# Patient Record
Sex: FEMALE | Employment: UNEMPLOYED | URBAN - METROPOLITAN AREA
[De-identification: names, ages, dates, MRNs, and addresses within clinical notes are randomized per-mention and may not be internally consistent; named-entity substitution may affect disease eponyms.]

---

## 2020-10-21 ENCOUNTER — HOSPITAL ENCOUNTER (OUTPATIENT)
Dept: OCCUPATIONAL THERAPY | Age: 35
Setting detail: THERAPIES SERIES
Discharge: HOME | End: 2020-10-21
Payer: COMMERCIAL

## 2020-10-21 PROCEDURE — L3933 FO W/O JOINTS CF: HCPCS | Performed by: OCCUPATIONAL THERAPIST

## 2020-10-26 ENCOUNTER — OFFICE VISIT (OUTPATIENT)
Dept: ORTHOPEDIC SURGERY | Age: 35
End: 2020-10-26
Payer: COMMERCIAL

## 2020-10-26 VITALS — HEIGHT: 63 IN | BODY MASS INDEX: 19.49 KG/M2 | WEIGHT: 110 LBS

## 2020-10-26 PROCEDURE — G8427 DOCREV CUR MEDS BY ELIG CLIN: HCPCS | Performed by: ORTHOPAEDIC SURGERY

## 2020-10-26 PROCEDURE — G8484 FLU IMMUNIZE NO ADMIN: HCPCS | Performed by: ORTHOPAEDIC SURGERY

## 2020-10-26 PROCEDURE — 1036F TOBACCO NON-USER: CPT | Performed by: ORTHOPAEDIC SURGERY

## 2020-10-26 PROCEDURE — G8420 CALC BMI NORM PARAMETERS: HCPCS | Performed by: ORTHOPAEDIC SURGERY

## 2020-10-26 PROCEDURE — 99203 OFFICE O/P NEW LOW 30 MIN: CPT | Performed by: ORTHOPAEDIC SURGERY

## 2020-10-26 NOTE — PROGRESS NOTES
Assessment: Avulsion fracture of the left small finger PIP joint with a fairly significantly displaced fragment although clinically she only opens about 5 degrees to radially directed stress. She probably also by clinical exam has an associated injury to her volar plate    Treatment Plan: Hopefully this will scarring well enough to give her good stability of the PIP joint with her clinical exam today. We immediately had a clamshell splint fabricated last week after her  Giuseppe Estrada sent me the x-rays that he had taken. She feels like this is really helping her symptoms. I made sure that she understood not to do any radial or ulnar deviating stress with the finger but to start early range of motion in the axis of rotation of the joint    Return in about 3 weeks (around 11/16/2020) for X-ray next visit. Chief Complaint:  Hand Pain (left small finger injury 10/15/2020)      History of Present Illness  Amaris Choi is a 37 y.o. female. Location: left small  Severity: moderate pain Duration: 10/15/20 Modifying factors: holding on to 2 horses, one horse tried to run her over and her left small finger got stuck in a knot. Last week a splint was made for her in therapy, it helps a lot. Associated symptoms: none    Contributory History  None    Medical History    No current outpatient medications on file prior to visit. No current facility-administered medications on file prior to visit. No past medical history on file.   Allergies   Allergen Reactions    Ibuprofen Other (See Comments)     Motrin   Tingling in all extremeties     Social History     Socioeconomic History    Marital status:      Spouse name: Not on file    Number of children: Not on file    Years of education: Not on file    Highest education level: Not on file   Occupational History    Not on file   Social Needs    Financial resource strain: Not on file    Food insecurity     Worry: Not on file     Inability: Not on file    Transportation needs     Medical: Not on file     Non-medical: Not on file   Tobacco Use    Smoking status: Never Smoker    Smokeless tobacco: Never Used   Substance and Sexual Activity    Alcohol use: Not on file    Drug use: Not on file    Sexual activity: Not on file   Lifestyle    Physical activity     Days per week: Not on file     Minutes per session: Not on file    Stress: Not on file   Relationships    Social connections     Talks on phone: Not on file     Gets together: Not on file     Attends Christian service: Not on file     Active member of club or organization: Not on file     Attends meetings of clubs or organizations: Not on file     Relationship status: Not on file    Intimate partner violence     Fear of current or ex partner: Not on file     Emotionally abused: Not on file     Physically abused: Not on file     Forced sexual activity: Not on file   Other Topics Concern    Not on file   Social History Narrative    Not on file     No family history on file. Patient's medications, allergies, past medical, surgical, social and family histories were reviewed and updated as appropriate. Review of Systems  Pertinent items are noted in HPI  Denies fever chills, confusion and bowel and bladder active change  Complete Review of Systems reviewed from patient history form dated 10/26/2020 and available in the patients chart under the media tab. Vital Signs  Vitals:    10/26/20 1315   Weight: 110 lb (49.9 kg)   Height: 5' 3\" (1.6 m)     Body mass index is 19.49 kg/m².      Physical Exam  Constitutional: Normal nutritional status  Mental Status: Alert and oriented  Skin: No rashes or erythema  Lymphatic: No lymphadenopathy        Left Hand Examination:  Inspection: Mild swelling of the PIP joint small finger  Finger Range of Motion: She has about 90 degrees flexion lacks about 10 degrees of full extension  Wrist Range of Motion: Normal  Vascular Exam: Normal capillary refill  Neurologic Exam: No associated numbness or tingling  Intrinsic Muscle Strength: Normal  Extrinsic Muscle Strength: Normal  Special Tests:      Neck Exam: Full range of motion no pain    Additional Comments:     Additional Examinations:  X-Ray Findings: PA lateral and oblique x-rays of the left small finger show a bone fragment on the ulnar aspect of the PIP joint retracted proximally. This appears to actually be an avulsion fracture from the insertion of the collateral ligament which would mean that it displaced significantly proximally. I was worried on the initial x-rays that Jennifer Evelin to take in with his veterinary x-ray machine that she could have a pilon type fracture as well but I do not really detect this today on these more magnified radiographs  Additional Diagnostic Test Findings:    Office Procedures:      Time statement: This dictation was performed with a verbal recognition program. It is possible that there are still dictated errors within this office note. All efforts were made to ensure that this office note is accurate. Orders Placed This Encounter   Procedures    XR FINGER LEFT (MIN 2 VIEWS)     Standing Status:   Future     Number of Occurrences:   1     Standing Expiration Date:   10/26/2021       Attestation: I have reviewed the chief complaint and history of present illness (including ROS and PFSH) and vital documentation by my staff and I agree with their documentation and have added where applicable.

## 2020-11-16 ENCOUNTER — OFFICE VISIT (OUTPATIENT)
Dept: ORTHOPEDIC SURGERY | Age: 35
End: 2020-11-16
Payer: COMMERCIAL

## 2020-11-16 VITALS — HEIGHT: 63 IN | BODY MASS INDEX: 19.49 KG/M2 | WEIGHT: 110 LBS | RESPIRATION RATE: 16 BRPM

## 2020-11-16 PROCEDURE — 1036F TOBACCO NON-USER: CPT | Performed by: ORTHOPAEDIC SURGERY

## 2020-11-16 PROCEDURE — G8427 DOCREV CUR MEDS BY ELIG CLIN: HCPCS | Performed by: ORTHOPAEDIC SURGERY

## 2020-11-16 PROCEDURE — 99213 OFFICE O/P EST LOW 20 MIN: CPT | Performed by: ORTHOPAEDIC SURGERY

## 2020-11-16 PROCEDURE — G8420 CALC BMI NORM PARAMETERS: HCPCS | Performed by: ORTHOPAEDIC SURGERY

## 2020-11-16 PROCEDURE — G8484 FLU IMMUNIZE NO ADMIN: HCPCS | Performed by: ORTHOPAEDIC SURGERY

## 2020-11-16 NOTE — PROGRESS NOTES
Assessment: Ander Reed is now about 6 weeks post injury with a small fragment avulsion fracture of the ulnar collateral ligament of the small finger proximal interphalangeal joint on the left hand    Treatment Plan: We have been treating this with splinting to prevent radial deviation. She opens about 5 degrees to radially directed stress and I think were going to be able to continue to treat this nonoperatively in spite of the displacement of the small fragment. She is going to start doing home range of motion exercises but continue with splinting for another month    Return in about 4 weeks (around 12/14/2020) for X-ray next visit. History of Present Illness  Martha Costello is a 37 y.o. female. Location:  Left small finger Severity: moderate pain Duration: 10/15/2020  Modifying factors: still having some pain, splint helps a lot Associated symptoms: none    Review of Systems  Pertinent items are noted in HPI  Denies fever chills, confusion and bowel and bladder active change  Complete Review of Systems reviewed from patient history form dated 10/26/20 and available in the patients chart under the media tab. Vital Signs  Vitals:    11/16/20 1336   Resp: 16   Weight: 110 lb (49.9 kg)   Height: 5' 3\" (1.6 m)     Body mass index is 19.49 kg/m². Contributory History  None    Medical History  No past medical history on file. No current outpatient medications on file prior to visit. No current facility-administered medications on file prior to visit.       Allergies   Allergen Reactions    Ibuprofen Other (See Comments)     Motrin   Tingling in all extremeties     Social History     Socioeconomic History    Marital status:      Spouse name: Not on file    Number of children: Not on file    Years of education: Not on file    Highest education level: Not on file   Occupational History    Not on file   Social Needs    Financial resource strain: Not on file    Food insecurity     Worry: Not on file     Inability: Not on file    Transportation needs     Medical: Not on file     Non-medical: Not on file   Tobacco Use    Smoking status: Never Smoker    Smokeless tobacco: Never Used   Substance and Sexual Activity    Alcohol use: Not on file    Drug use: Not on file    Sexual activity: Not on file   Lifestyle    Physical activity     Days per week: Not on file     Minutes per session: Not on file    Stress: Not on file   Relationships    Social connections     Talks on phone: Not on file     Gets together: Not on file     Attends Holiness service: Not on file     Active member of club or organization: Not on file     Attends meetings of clubs or organizations: Not on file     Relationship status: Not on file    Intimate partner violence     Fear of current or ex partner: Not on file     Emotionally abused: Not on file     Physically abused: Not on file     Forced sexual activity: Not on file   Other Topics Concern    Not on file   Social History Narrative    Not on file     No family history on file. Physical Exam  Constitutional: Normal nutritional status  Mental Status: Alert and oriented  Skin: No rashes or erythema  Lymphatic: No lymphadenopathy    Hand Examination: She still has just mild swelling around the PIP joint. She is stable to ulnar directed stress opens about 5 degrees to radially directed stress but has a fairly firm endpoint. She has full extension can flex to about 80 degrees. Vascular exam normal capillary refill. Neurologic exam no numbness or tingling    Additional Comments:     Additional Examinations:  X-Ray Findings: PA lateral oblique x-rays of the left small finger show congruent alignment of the proximal interphalangeal joint. The small fragment avulsion fracture remains in the same position which is retracted proximally to the neck of the proximal phalangeal level  Additional Diagnostic Test Findings:    Office Procedures:    Time Statement:     This

## 2020-12-14 ENCOUNTER — OFFICE VISIT (OUTPATIENT)
Dept: ORTHOPEDIC SURGERY | Age: 35
End: 2020-12-14
Payer: COMMERCIAL

## 2020-12-14 PROCEDURE — 1036F TOBACCO NON-USER: CPT | Performed by: ORTHOPAEDIC SURGERY

## 2020-12-14 PROCEDURE — G8420 CALC BMI NORM PARAMETERS: HCPCS | Performed by: ORTHOPAEDIC SURGERY

## 2020-12-14 PROCEDURE — G8427 DOCREV CUR MEDS BY ELIG CLIN: HCPCS | Performed by: ORTHOPAEDIC SURGERY

## 2020-12-14 PROCEDURE — G8484 FLU IMMUNIZE NO ADMIN: HCPCS | Performed by: ORTHOPAEDIC SURGERY

## 2020-12-14 PROCEDURE — 99213 OFFICE O/P EST LOW 20 MIN: CPT | Performed by: ORTHOPAEDIC SURGERY

## 2020-12-14 NOTE — PROGRESS NOTES
Assessment: We are finally starting to get excellent stability of the ulnar collateral ligament avulsion fracture left small finger. Treatment Plan: Ander Reed can now start to a taper out of her splint and just use it during strenuous activities. I do think a few visits with [facility] and hand therapy will help her progress with her active and passive range of motion    Return in about 4 weeks (around 1/11/2021) for X-ray next visit. History of Present Illness  Martha Costello is a 37 y.o. female. Location:  Left small finger Severity: no pain Duration: 10/15/20  Modifying factors: mild to no pain Associated symptoms: none    Review of Systems  Pertinent items are noted in HPI  Denies fever chills, confusion and bowel and bladder active change  Complete Review of Systems reviewed from patient history form dated 10/26/20 and available in the patients chart under the media tab. Vital Signs  There were no vitals filed for this visit. There is no height or weight on file to calculate BMI. Contributory History        Medical History  No past medical history on file. No current outpatient medications on file prior to visit. No current facility-administered medications on file prior to visit.       Allergies   Allergen Reactions    Ibuprofen Other (See Comments)     Motrin   Tingling in all extremeties     Social History     Socioeconomic History    Marital status:      Spouse name: Not on file    Number of children: Not on file    Years of education: Not on file    Highest education level: Not on file   Occupational History    Not on file   Social Needs    Financial resource strain: Not on file    Food insecurity     Worry: Not on file     Inability: Not on file    Transportation needs     Medical: Not on file     Non-medical: Not on file   Tobacco Use    Smoking status: Never Smoker    Smokeless tobacco: Never Used   Substance and Sexual Activity    Alcohol use: Not on file    Drug use: Not on file    Sexual activity: Not on file   Lifestyle    Physical activity     Days per week: Not on file     Minutes per session: Not on file    Stress: Not on file   Relationships    Social connections     Talks on phone: Not on file     Gets together: Not on file     Attends Jehovah's witness service: Not on file     Active member of club or organization: Not on file     Attends meetings of clubs or organizations: Not on file     Relationship status: Not on file    Intimate partner violence     Fear of current or ex partner: Not on file     Emotionally abused: Not on file     Physically abused: Not on file     Forced sexual activity: Not on file   Other Topics Concern    Not on file   Social History Narrative    Not on file     No family history on file. Physical Exam  Constitutional: Normal  Mental Status: Alert and oriented  Skin: No rashes or erythema  Lymphatic: No lymphadenopathy    Hand Examination: Minimal residual swelling around the PIP joint. She can now flex to about 80 degrees at the PIP joint about 40 degrees of the DIP joint lacking about 1 to 1-1/2 cm from touching her distal palmar crease. The good thing about today's examination is were finally starting to see a much more stable ulnar collateral ligament with a very firm endpoint. She now really does not open to radially directed stress    Additional Comments:     Additional Examinations:  X-Ray Findings: PA lateral oblique x-rays of the left small finger show that the small fragment avulsion fracture remains in the same position there is congruent alignment of the PIP joint  Additional Diagnostic Test Findings:    Office Procedures:    Time Statement: This dictation was performed with a verbal recognition program. It is possible that there are still dictated errors within this office note. All efforts were made to ensure that this office note is accurate.     Orders Placed This Encounter   Procedures    XR FINGER LEFT (MIN 2 VIEWS)     Standing Status:   Future     Number of Occurrences:   1     Standing Expiration Date:   12/14/2021   [facility] Occupation Therapy     Referral Priority:   Routine     Referral Type:   Eval and Treat     Referral Reason:   Specialty Services Required     Requested Specialty:   Occupational Therapy     Number of Visits Requested:   1       Attestation: I have reviewed the chief complaint and history of present illness (including ROS and [facility]) and vital documentation by my staff and I agree with their documentation and have added where applicable.

## 2021-01-11 ENCOUNTER — OFFICE VISIT (OUTPATIENT)
Dept: ORTHOPEDIC SURGERY | Age: 36
End: 2021-01-11
Payer: COMMERCIAL

## 2021-01-11 DIAGNOSIS — S62.619A CLOSED AVULSION FRACTURE OF PROXIMAL PHALANX OF FINGER, INITIAL ENCOUNTER: Primary | ICD-10-CM

## 2021-01-11 PROCEDURE — 1036F TOBACCO NON-USER: CPT | Performed by: ORTHOPAEDIC SURGERY

## 2021-01-11 PROCEDURE — 99212 OFFICE O/P EST SF 10 MIN: CPT | Performed by: ORTHOPAEDIC SURGERY

## 2021-01-11 PROCEDURE — G8420 CALC BMI NORM PARAMETERS: HCPCS | Performed by: ORTHOPAEDIC SURGERY

## 2021-01-11 PROCEDURE — G8484 FLU IMMUNIZE NO ADMIN: HCPCS | Performed by: ORTHOPAEDIC SURGERY

## 2021-01-11 PROCEDURE — G8427 DOCREV CUR MEDS BY ELIG CLIN: HCPCS | Performed by: ORTHOPAEDIC SURGERY

## 2021-01-11 NOTE — PROGRESS NOTES
file     Minutes per session: Not on file    Stress: Not on file   Relationships    Social connections     Talks on phone: Not on file     Gets together: Not on file     Attends Scientologist service: Not on file     Active member of club or organization: Not on file     Attends meetings of clubs or organizations: Not on file     Relationship status: Not on file    Intimate partner violence     Fear of current or ex partner: Not on file     Emotionally abused: Not on file     Physically abused: Not on file     Forced sexual activity: Not on file   Other Topics Concern    Not on file   Social History Narrative    Not on file     No family history on file. Physical Exam  Constitutional: Normal nutritional status alert and oriented  Mental Status: Oriented  Skin: No rashes or erythema  Lymphatic: No lymphadenopathy    Hand Examination: Range of motion is very good as noted above full flexion and 5 degrees lack of full extension she is now stable to radial and ulnar directed stress    Her main swelling is actually over the radial collateral ligament although the avulsion fracture is over the ulnar collateral ligament    Additional Comments:     Additional Examinations:  X-Ray Findings: PA lateral and oblique x-rays of the left wrist show the small finger has congruent alignment of the PIP joint. The very small avulsion fragment of the ulnar collateral ligament remains in the same position fibrosing and at the origin of the ulnar collateral ligament. Additional Diagnostic Test Findings:    Office Procedures:    Time Statement: This dictation was performed with a verbal recognition program. It is possible that there are still dictated errors within this office note. All efforts were made to ensure that this office note is accurate.     Orders Placed This Encounter   Procedures    XR WRIST LEFT (MIN 3 VIEWS)     Standing Status:   Future     Number of Occurrences:   1     Standing Expiration Date:   1/11/2022 Attestation: I have reviewed the chief complaint and history of present illness (including ROS and PFSH) and vital documentation by my staff and I agree with their documentation and have added where applicable.

## 2023-12-12 ENCOUNTER — OFFICE VISIT (OUTPATIENT)
Dept: URGENT CARE | Age: 38
End: 2023-12-12

## 2023-12-12 VITALS
BODY MASS INDEX: 21.85 KG/M2 | WEIGHT: 128 LBS | HEART RATE: 103 BPM | HEIGHT: 64 IN | DIASTOLIC BLOOD PRESSURE: 73 MMHG | SYSTOLIC BLOOD PRESSURE: 106 MMHG | TEMPERATURE: 101 F | OXYGEN SATURATION: 99 %

## 2023-12-12 DIAGNOSIS — R50.9 FEVER, UNSPECIFIED FEVER CAUSE: Primary | ICD-10-CM

## 2023-12-12 LAB
Lab: NORMAL
QC PASS/FAIL: NORMAL
SARS-COV-2 RDRP RESP QL NAA+PROBE: NEGATIVE

## 2023-12-12 ASSESSMENT — ENCOUNTER SYMPTOMS
SORE THROAT: 0
NAUSEA: 0
COUGH: 1
CONSTIPATION: 0
ABDOMINAL PAIN: 0
SHORTNESS OF BREATH: 0
VOMITING: 0
SINUS PRESSURE: 1
CHEST TIGHTNESS: 0
WHEEZING: 0
DIARRHEA: 0

## 2023-12-12 NOTE — PATIENT INSTRUCTIONS
Please continue to take ibuprofen and tylenol for symptoms. Please go to ED if you develop shortness of breath  Please follow up with PCP or return to urgent care if symptoms persist beyond 7 days.